# Patient Record
(demographics unavailable — no encounter records)

---

## 2024-10-14 NOTE — ASSESSMENT
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

## 2024-10-14 NOTE — PHYSICAL EXAM
[No Acute Distress] : no acute distress [Well Nourished] : well nourished [Well Developed] : well developed [Well-Appearing] : well-appearing [Normal Sclera/Conjunctiva] : normal sclera/conjunctiva [PERRL] : pupils equal round and reactive to light [EOMI] : extraocular movements intact [Normal Outer Ear/Nose] : the outer ears and nose were normal in appearance [Normal Oropharynx] : the oropharynx was normal [No JVD] : no jugular venous distention [No Lymphadenopathy] : no lymphadenopathy [Supple] : supple [Thyroid Normal, No Nodules] : the thyroid was normal and there were no nodules present [No Respiratory Distress] : no respiratory distress  [No Accessory Muscle Use] : no accessory muscle use [Clear to Auscultation] : lungs were clear to auscultation bilaterally [Normal Rate] : normal rate  [Regular Rhythm] : with a regular rhythm [Normal S1, S2] : normal S1 and S2 [I] : a grade 1 [No Carotid Bruits] : no carotid bruits [No Abdominal Bruit] : a ~M bruit was not heard ~T in the abdomen [No Varicosities] : no varicosities [Pedal Pulses Present] : the pedal pulses are present [No Edema] : there was no peripheral edema [No Palpable Aorta] : no palpable aorta [No Extremity Clubbing/Cyanosis] : no extremity clubbing/cyanosis [Soft] : abdomen soft [Non Tender] : non-tender [Non-distended] : non-distended [No Masses] : no abdominal mass palpated [No HSM] : no HSM [Normal Bowel Sounds] : normal bowel sounds [Normal Posterior Cervical Nodes] : no posterior cervical lymphadenopathy [Normal Anterior Cervical Nodes] : no anterior cervical lymphadenopathy [No CVA Tenderness] : no CVA  tenderness [No Spinal Tenderness] : no spinal tenderness [Kyphosis] : kyphosis [Scoliosis] : scoliosis [No Joint Swelling] : no joint swelling [Grossly Normal Strength/Tone] : grossly normal strength/tone [No Rash] : no rash [Coordination Grossly Intact] : coordination grossly intact [No Focal Deficits] : no focal deficits [Normal Affect] : the affect was normal [Normal Insight/Judgement] : insight and judgment were intact

## 2024-10-14 NOTE — HISTORY OF PRESENT ILLNESS
[FreeTextEntry1] : Here for complete exam and here to follow ongoing conditions [de-identified] : off tamoxifen on anastrozole Last seen in clearance for hysteroscopy with dilation and curettage of endometrial polyp 12/2023 s/p partial parathyroidectomy for primary hyperparathyroidism Dr. Tika Toussaint 6/2018 CAT scan chest May 2018 showing bilateral tree in bud nodules, bronchiectasis, and nodular opacities possibly due to mucoid impaction saw HZ in consultation Patient does have occasional coughing issues last CT chest 1//8/2020 regular gyn and pap Haselcorn RIGHT BREAST DCIS  scoliosis more bothersome saw ortho Alejandro had PT 20 sessions declines CT chest follow up home DBP under 80 osteoporosis seeing Dr Rogers FINISHED eVENITY MGUS MVP Echocardiogram 10/7/2022 showed LVEF 65 to 70%, normal LA, mild aortic stenosis, mild to moderate aortic insufficiency, mild mitral insufficiency Echocardiogram October 12, 2023 showed LVEF 70 to 75%, normal atria, mild mitral insufficiency, fibrocalcific aortic valve sclerosis without stenosis, mild to moderate aortic insufficiency

## 2024-10-14 NOTE — PLAN
[FreeTextEntry1] : check bloods ct chest follow up for bronchiectasis FH mother lung cancer non smoker

## 2024-10-14 NOTE — HEALTH RISK ASSESSMENT
[Very Good] : ~his/her~  mood as very good [No] : No [One fall no injury in past year] : Patient reported one fall in the past year without injury [0] : 2) Feeling down, depressed, or hopeless: Not at all (0) [PHQ-2 Negative - No further assessment needed] : PHQ-2 Negative - No further assessment needed [Never] : Never [NO] : No [Patient reported mammogram was abnormal] : Patient reported mammogram was abnormal [Patient reported PAP Smear was normal] : Patient reported PAP Smear was normal [Patient reported bone density results were abnormal] : Patient reported bone density results were abnormal [Patient reported colonoscopy was abnormal] : Patient reported colonoscopy was abnormal [Fully functional (bathing, dressing, toileting, transferring, walking, feeding)] : Fully functional (bathing, dressing, toileting, transferring, walking, feeding) [Fully functional (using the telephone, shopping, preparing meals, housekeeping, doing laundry, using] : Fully functional and needs no help or supervision to perform IADLs (using the telephone, shopping, preparing meals, housekeeping, doing laundry, using transportation, managing medications and managing finances) [[id number]] : 0 [Change in mental status noted] : No change in mental status noted [Language] : denies difficulty with language [Behavior] : denies difficulty with behavior [Learning/Retaining New Information] : denies difficulty learning/retaining new information [Handling Complex Tasks] : denies difficulty handling complex tasks [Reasoning] : denies difficulty with reasoning [Spatial Ability and Orientation] : denies difficulty with spatial ability and orientation [MammogramDate] : 2/2024 [MammogramComments] : DCIS [PapSmearDate] : 11/2023 [BoneDensityDate] : 10/2023 [ColonoscopyDate] : 9/2024 [ColonoscopyComments] : Inflammatory polyp

## 2024-12-04 NOTE — PROCEDURE
[FreeTextEntry1] : Blood draw CT CHEST  - ORDERED BY: ASAD CRISTOBAL PROCEDURE DATE:  10/28/2024 INTERPRETATION:  CLINICAL INFORMATION: Cough. COMPARISON: CT chest 1/8/2020, 5/26/2018 CONTRAST/COMPLICATIONS: IV Contrast: NONE Oral Contrast: NONE Complications: None reported at time of study completion PROCEDURE: CT scan of the chest was obtained without intravenous contrast. FINDINGS: AIRWAYS/LUNGS/PLEURA: Scattered areas of bronchiectasis predominantly involving the right middle lobe and lingula are unchanged. Chronic segmental atelectasis involving the right middle lobe and lingula is minimally increased from comparison study. There are diffuse bilateral scattered nodular/branching opacities likely representing mucoid opacity distal airways, some of which are new and others of which are increased from comparison exam. Biapical scarring scarring is unchanged. Redemonstrated trace fluid surrounding an area of bronchiectasis in the right upper lobe, not significantly changed from comparison exam. No left pleural effusion. MEDIASTINUM AND NAYAN: No lymphadenopathy. 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 VESSELS: Aortic and coronary artery atherosclerotic changes. HEART: Heart size is normal. No pericardial effusion. VISUALIZED UPPER ABDOMEN: Unchanged right hepatic cyst. Partially imaged left renal cyst. New 8 mm hypodensity in the pancreatic body (2-114). CHEST WALL AND BONES: Chest wall within normal limits. Degenerative changes the bones. IMPRESSION: Bilateral nodular/branching opacities are slightly increased from comparison exam likely representing mucoid impacted distal airways. Bilateral areas of bronchiectasis and atelectasis, not significantly changed from prior exam. 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 Interval development of an 8 mm hypodensity in the pancreatic body which can be further evaluated with contrast-enhanced MRCP  PFT December 4, 2024 Flow rates normal range No bronchodilator sponsor FEV1 Flow volume loop demonstrate some technical limitations due to cough associated with maneuvers Lung volumes normal Total lung present 107% predicted No air trapping Diffusion normal range 79% predicted Hemoglobin 12.3  Pulmonary 6-minute walk exercise study December 4, 2024 Baseline room air O2 saturation 97% Normal study No evidence of exercise-induced hypoxemia

## 2024-12-04 NOTE — REVIEW OF SYSTEMS
[Nasal Congestion] : nasal congestion [Cough] : cough [Negative] : Psychiatric [Hemoptysis] : no hemoptysis [Chest Tightness] : no chest tightness [Sputum] : no sputum [Pleuritic Pain] : no pleuritic pain [Wheezing] : no wheezing [SOB on Exertion] : no sob on exertion [Edema] : no edema [Orthopnea] : no orthopnea [Palpitations] : no palpitations [Phlebitis] : no phlebitis [Syncope] : no syncope [TextBox_44] : AI/ AS, HTN, MVP, Mild HLD [TextBox_113] : DCIS [TextBox_144] : Pre DM, hx hyper parathyroidism with resection

## 2024-12-04 NOTE — PHYSICAL EXAM
[No Acute Distress] : no acute distress [Normal Oropharynx] : normal oropharynx [I] : Mallampati Class: I [Normal Appearance] : normal appearance [No Neck Mass] : no neck mass [Normal Rate/Rhythm] : normal rate/rhythm [Normal S1, S2] : normal s1, s2 [No Murmurs] : no murmurs [No Gallops] : no gallops [No Resp Distress] : no resp distress [No Acc Muscle Use] : no acc muscle use [Normal Palpation] : normal palpation [Normal Rhythm and Effort] : normal rhythm and effort [Clear to Auscultation Bilaterally] : clear to auscultation bilaterally [Normal to Percussion] : normal to percussion [No Abnormalities] : no abnormalities [Benign] : benign [Normal Gait] : normal gait [No Clubbing] : no clubbing [No Cyanosis] : no cyanosis [No Edema] : no edema [FROM] : FROM [Normal Color/ Pigmentation] : normal color/ pigmentation [No Focal Deficits] : no focal deficits [Oriented x3] : oriented x3 [Normal Affect] : normal affect [TextBox_99] : Positive kyphosis

## 2024-12-04 NOTE — HISTORY OF PRESENT ILLNESS
[Never] : never [TextBox_4] : 12/4/2024 73 yo female, referred from Dr. Evans Used to see another pulmonologist who retired, Dr. Henning CT chest was done 11/18/24, findings in chart  cough present, started in the fall, and more than usual  alot of allergies in college, ragweed mentioned, does not really pay attention to allergies as of late  eyes are always ithcy and red year round

## 2024-12-04 NOTE — DISCUSSION/SUMMARY
[FreeTextEntry1] : Cough CT chest imaging as noted Bronchiectasis Mucoid impacted airways Cannot exclude nontuberculosis/ALEXANDRA MAC but patient has a paucity of symptoms and no sputum production At this point I recommended treatment with controller therapy to try to reduce some of the airway inflammatory disease and mucus We then consider a repeat CT chest 3 to 6 months Patient was placed on a sample of Trelegy Ellipta 100 mcg dose 1 puff daily Detailed MDI instructions were provided Patient will rinse after use She was informed to notify office with any complications I did inform her to let us know she does bring up or produce any sputum which should be collected for cultures Other options if there is continued progression of disease will be bronchoscopy for culture

## 2024-12-04 NOTE — CONSULT LETTER
[Dear  ___] : Dear  [unfilled], [Consult Letter:] : I had the pleasure of evaluating your patient, [unfilled]. [Please see my note below.] : Please see my note below. [Consult Closing:] : Thank you very much for allowing me to participate in the care of this patient.  If you have any questions, please do not hesitate to contact me. [Sincerely,] : Sincerely, [FreeTextEntry3] : Boris Chávez D.O., OLENA.  of Medicine [facility]

## 2024-12-31 NOTE — ASSESSMENT
[FreeTextEntry1] : 1. Osteoporosis. H/o PHPT - I'd avoid PTH analogues in view of h/o PHPT - calcium 500 mg qd, extra OTC vitamin D3 2,000 IU/day - add more weight-bearing exercises; advised avoiding high risk sports - dental f/u advised. - s/p Evenity- cont Prolia via BB; injection  today  Once again, risks and benefits were reviewed in details. REMS provided.  *** Prolia 60 mg/ml Lot # [id number] Exp. 04/30/2027  - DXA in 11/25-  patient is now on arimidex - monitor ca/PTH/vit D.  2. H/o prediabetes - monitor a1c. last one is fine.  RTC 6 months for a f/u and Prolia injection

## 2024-12-31 NOTE — HISTORY OF PRESENT ILLNESS
[FreeTextEntry1] : 74 year female  f/u for osteoporosis management.   *** Dec 31, 2024 ***  feels well overall,  had a fall in bar a couple of months ago, but no fractures no jaw/thigh pain staying on Arimidex saw Dr. Ananda Dunbar  (S)- no surgical intervention at present recent labs reviewed as below  DXA(11/18/24)- L1, L4 (+0.9), LFN (-2.8), RFN (-2.6), radius 33% (-3.4) DXA(10/19/23)- L1 and L4 (+0.9), LFN (-2.7), RFN (-2.7), radius 33% (-2.6) DXA (1/21/21) at [facility]- L1 (-3.1). significant OA changes in L2-L4, FN (-2.9)  *** Jun 12, 2024 ***  Patient returned for PE and Prolia injection. doing well. denies jaw/thigh pain started on Arimidex 1 mg qd by Dr. Monahan labs from last week reviewed, as below *** Prolia 60 mg/ml Lot #  Exp.    *** Dec 12, 2023 ***  completed Evenity Patient returned for Prolia injection. Once again, risks and benefits were reviewed in details. REMS provided.  *** Prolia 60 mg/ml Lot # [id number] Exp. 3/31/2026  DXA(10/19/23)- L1 and L4 (+0.9), LFN (-2.7), RFN (-2.7), radius 33% (-2.6) DXA (1/21/21) at [facility]- L1 (-3.1). significant OA changes in L2-L4, FN (-2.9)  *** Sep 11, 2023 ***  Patient returned for Evenity injection # 12 feels well. denies jaw/thighs pain.  Once again, risks and benefits were reviewed in details. REMS provided.  *** Evenity 105 mg/1.17ml Lot # [id number] Exp. 7/25   *** Aug 10, 2023 ***  Patient returned for Evenity injection # 11 feels well. denies jaw/thighs pain.  Once again, risks and benefits were reviewed in details. REMS provided.  *** Evenity 105 mg/1.17ml Lot # [id number] Exp. 9/25   *** Jul 11, 2023 ***  Patient returned for Evenity injection # 10 feels well. denies jaw/thighs pain.  Once again, risks and benefits were reviewed in details. REMS provided.  *** Evenity 105 mg/1.17ml Lot # [id number] Exp. 7/25   *** Jun 08, 2023 ***  Patient returned for Evenity injection # 9 saw a radiation oncologist- decided against it. remains on tamoxifen feels well. denies jaw/thighs pain.  Once again, risks and benefits were reviewed in details. REMS provided.  *** Evenity 105 mg/1.17ml Lot # [id number] Exp. 7/25   *** May 08, 2023 ***  Patient returned for Evenity injection # 8 feels well. denies jaw/thighs pain. s/p lumpectomy (DIS) on 03/09 by Dr. Connell- to see a radiation oncology Once again, risks and benefits were reviewed in details. REMS provided.  *** Evenity 105 mg/1.17ml Lot # [id number] Exp. 6/25  *** Apr 05, 2023 ***  Patient returned for Evenity injection # 7 feels well. denies jaw/thighs pain. s/p lumpectomy (DIS) on 03/09 by Dr. Connell- to see a radiation oncology Once again, risks and benefits were reviewed in details. REMS provided.  *** Evenity 105 mg/1.17ml Lot # [id number] Exp. 6/25  *** Mar 06, 2023 ***  Patient returned for Evenity injection # 6 feels well. denies jaw/thighs pain Once again, risks and benefits were reviewed in details. REMS provided.  *** Evenity 105 mg/1.17ml Lot # [id number] Exp. 2/25  *** Feb 06, 2023 ***  Patient returned for Evenity injection # 5 feels well. denies jaw/thighs pain Once again, risks and benefits were reviewed in details. REMS provided.  *** Evenity 105 mg/1.17ml Lot # [id number] Exp. 5/25    *** Jan 03, 2023 ***  Patient returned for Evenity injection # 4  feels well. denies jaw/thighs pain Once again, risks and benefits were reviewed in details. REMS provided.  *** Evenity 105 mg/1.17ml Lot # [id number] Exp. 2/25  *** Nov 30, 2022 ***  Patient returned for Evenity injection # 3  feels well. denies jaw/thighs pain Once again, risks and benefits were reviewed in details. REMS provided.  *** Evenity 105 mg/1.17ml Lot # [id number] Exp. 1/25   *** Oct 31, 2022 ***  Patient returned for Evenity injection # 2  tolerated well. no jaw/thighs pain Once again, risks and benefits were reviewed in details. REMS provided.  *** Evenity 105 mg/1.17ml Lot # [id number] Exp. 9/24   *** Sep 29, 2022 ***  Patient returned for Evenity injection. Once again, risks and benefits were reviewed in details. REMS provided.  *** Evenity 105 mg/1.17ml Lot # [id number] Exp. 9/24   feels well  labs 2 wks ago- ca- 10.1, creat- 0.66 taking calcium citrate  *** Aug 29, 2022 ***  did not f/u post initial visit seeing Dr. Monahan for a monoclonal gammopathy still undecided about her ART, but is leaning towards Evenity  HPI: She  was diagnosed with osteoporosis in , on Fosamax for 10 years, stopped for about a year after and resumed again for another year and stopped completely in 2011. She has not been on any ART since then.   She reports a history of PHPT, s/p parathyroidectomy in 6/18 (Dr. Toussaint) Cancer history: none Calcium and vitamin D supplementation: vitamin D3 1000 IU/day Fracture history: wrist fracture post fall in 2015 Bone disease risk factors: no history of kidney stones, liver disease, kidney disease, thyroid disease, anticonvulsant use, glucocorticoid use, autoimmune disorders such as rheumatoid arthritis and SLE, COPD, IBD, known malabsorption disorders, or weight loss.  Currently, no symptoms of polyuria, polydipsia, constipation, abdominal pain, mental confusion, depression, bone pain or fractures.  She  denies hot flashes, leg swelling, blood clots, history of radiation therapy. She  lost about 3" in height. She has a MVP, which has been stable. She goes to gym regularly.  Lab:  calcium- 10.2, TP- 8.6 DXA (1/21/21) at [facility]- L1 (-3.1). significant OA changes in L2-L4, FN (-2.9) DXA (4/24/18) at [facility] - FN (-3.1), LS (-0.7)- but no detailed images are available for review

## 2025-01-08 NOTE — HISTORY OF PRESENT ILLNESS
[Never] : never [TextBox_4] : 1/8/2025 75 yo female, here for follow up  is on trelegy 100 - she has had improvement with breathing / cough - 1 month sample supplied  cough still there but less - cough more so when laying down  was getting allergy shots at one point, but discontinued  is taking zaditor for itchy eyes BID   12/4/2024 75 yo female, referred from Dr. Evans Used to see another pulmonologist who retired, Dr. Henning CT chest was done 11/18/24, findings in chart  cough present, started in the fall, and more than usual  alot of allergies in college, ragweed mentioned, does not really pay attention to allergies as of late  eyes are always ithcy and red year round

## 2025-01-08 NOTE — CONSULT LETTER
[Dear  ___] : Dear  [unfilled], [Consult Letter:] : I had the pleasure of evaluating your patient, [unfilled]. [Please see my note below.] : Please see my note below. [Consult Closing:] : Thank you very much for allowing me to participate in the care of this patient.  If you have any questions, please do not hesitate to contact me. [Sincerely,] : Sincerely, [FreeTextEntry3] : Boris Chávez D.O., OLENA.  of Medicine [facility]

## 2025-01-08 NOTE — PROCEDURE
[FreeTextEntry1] : LEIGH ANN 1/8/25 flow  rates WNL stable  CT CHEST  - ORDERED BY: ASAD CRISTOBAL PROCEDURE DATE:  10/28/2024 INTERPRETATION:  CLINICAL INFORMATION: Cough. COMPARISON: CT chest 1/8/2020, 5/26/2018 CONTRAST/COMPLICATIONS: IV Contrast: NONE Oral Contrast: NONE Complications: None reported at time of study completion PROCEDURE: CT scan of the chest was obtained without intravenous contrast. FINDINGS: AIRWAYS/LUNGS/PLEURA: Scattered areas of bronchiectasis predominantly involving the right middle lobe and lingula are unchanged. Chronic segmental atelectasis involving the right middle lobe and lingula is minimally increased from comparison study. There are diffuse bilateral scattered nodular/branching opacities likely representing mucoid opacity distal airways, some of which are new and others of which are increased from comparison exam. Biapical scarring scarring is unchanged. Redemonstrated trace fluid surrounding an area of bronchiectasis in the right upper lobe, not significantly changed from comparison exam. No left pleural effusion. MEDIASTINUM AND NAYAN: No lymphadenopathy. 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 VESSELS: Aortic and coronary artery atherosclerotic changes. HEART: Heart size is normal. No pericardial effusion. VISUALIZED UPPER ABDOMEN: Unchanged right hepatic cyst. Partially imaged left renal cyst. New 8 mm hypodensity in the pancreatic body (2-114). CHEST WALL AND BONES: Chest wall within normal limits. Degenerative changes the bones. IMPRESSION: Bilateral nodular/branching opacities are slightly increased from comparison exam likely representing mucoid impacted distal airways. Bilateral areas of bronchiectasis and atelectasis, not significantly changed from prior exam. 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 Interval development of an 8 mm hypodensity in the pancreatic body which can be further evaluated with contrast-enhanced MRCP  PFT December 4, 2024 Flow rates normal range No bronchodilator sponsor FEV1 Flow volume loop demonstrate some technical limitations due to cough associated with maneuvers Lung volumes normal Total lung present 107% predicted No air trapping Diffusion normal range 79% predicted Hemoglobin 12.3  Pulmonary 6-minute walk exercise study December 4, 2024 Baseline room air O2 saturation 97% Normal study No evidence of exercise-induced hypoxemia  MDI detailed instructions 1/8/25

## 2025-01-08 NOTE — DISCUSSION/SUMMARY
[FreeTextEntry1] : Cough better on Trelegy 100 mcg 1 puff QD CT chest imaging as noted Bronchiectasis Mucoid impacted airways No Sputum Cannot exclude nontuberculosis/ALEXANDRA MAC but patient has a paucity of symptoms and no sputum production At this point I recommended treatment with controller therapy to try to reduce some of the airway inflammatory disease and mucus We then consider a repeat CT chest 3 to 6 months Patient was placed on a sample of Trelegy Ellipta 100 mcg dose 1 puff daily Detailed MDI instructions were provided Patient will rinse after use She was informed to notify office with any complications I did inform her to let us know she does bring up or produce any sputum which should be collected for cultures Other options if there is continued progression of disease will be bronchoscopy for culture Notify of any wheezing.  Notify if rescue inhaler is needed greater than 2-3 times in any week.  Avoid known triggers.

## 2025-04-09 NOTE — PROCEDURE
[FreeTextEntry1] : PFT 4/9/25  Spieo WNL  Lung Volumes WNL  DLCO 68 % with mild loss fx  alveolar  capillary units  HGB 12.0  LEIGH ANN 1/8/25 flow  rates WNL stable  CT CHEST  - ORDERED BY: ASAD CRISTOBAL PROCEDURE DATE:  10/28/2024 INTERPRETATION:  CLINICAL INFORMATION: Cough. COMPARISON: CT chest 1/8/2020, 5/26/2018 CONTRAST/COMPLICATIONS: IV Contrast: NONE Oral Contrast: NONE Complications: None reported at time of study completion PROCEDURE: CT scan of the chest was obtained without intravenous contrast. FINDINGS: AIRWAYS/LUNGS/PLEURA: Scattered areas of bronchiectasis predominantly involving the right middle lobe and lingula are unchanged. Chronic segmental atelectasis involving the right middle lobe and lingula is minimally increased from comparison study. There are diffuse bilateral scattered nodular/branching opacities likely representing mucoid opacity distal airways, some of which are new and others of which are increased from comparison exam. Biapical scarring scarring is unchanged. Redemonstrated trace fluid surrounding an area of bronchiectasis in the right upper lobe, not significantly changed from comparison exam. No left pleural effusion. MEDIASTINUM AND NAYAN: No lymphadenopathy. 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 VESSELS: Aortic and coronary artery atherosclerotic changes. HEART: Heart size is normal. No pericardial effusion. VISUALIZED UPPER ABDOMEN: Unchanged right hepatic cyst. Partially imaged left renal cyst. New 8 mm hypodensity in the pancreatic body (2-114). CHEST WALL AND BONES: Chest wall within normal limits. Degenerative changes the bones. IMPRESSION: Bilateral nodular/branching opacities are slightly increased from comparison exam likely representing mucoid impacted distal airways. Bilateral areas of bronchiectasis and atelectasis, not significantly changed from prior exam. 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 Interval development of an 8 mm hypodensity in the pancreatic body which can be further evaluated with contrast-enhanced MRCP  PFT December 4, 2024 Flow rates normal range No bronchodilator sponsor FEV1 Flow volume loop demonstrate some technical limitations due to cough associated with maneuvers Lung volumes normal Total lung present 107% predicted No air trapping Diffusion normal range 79% predicted Hemoglobin 12.3  Pulmonary 6-minute walk exercise study December 4, 2024 Baseline room air O2 saturation 97% Normal study No evidence of exercise-induced hypoxemia  MDI detailed instructions 1/8/25

## 2025-04-09 NOTE — DISCUSSION/SUMMARY
[FreeTextEntry1] : Cough better on Trelegy 100 mcg 1 puff QD CT chest imaging as noted Request to hold off recommended f/u Bronchiectasis Mucoid impacted airways No Sputum Cannot exclude nontuberculosis/ALEXANDRA MAC but patient has a paucity of symptoms and no sputum production At this point I recommended treatment with controller therapy to try to reduce some of the airway inflammatory disease and mucus We then consider a repeat CT chest 3 to 6 months address at f/u as per  patient request hold Patient was placed on a sample of Trelegy Ellipta 100 mcg dose 1 puff daily Detailed MDI instructions were provided Patient will rinse after use She was informed to notify office with any complications I did inform her to let us know she does bring up or produce any sputum which should be collected for cultures Other options if there is continued progression of disease will be bronchoscopy for culture Notify of any wheezing.  Notify if rescue inhaler is needed greater than 2-3 times in any week.  Avoid known triggers.

## 2025-04-09 NOTE — CONSULT LETTER
[Dear  ___] : Dear  [unfilled], [Consult Letter:] : I had the pleasure of evaluating your patient, [unfilled]. [Please see my note below.] : Please see my note below. [Consult Closing:] : Thank you very much for allowing me to participate in the care of this patient.  If you have any questions, please do not hesitate to contact me. [Sincerely,] : Sincerely, [FreeTextEntry3] : Boris Chávez D.O., OLENA.  of Medicine [facility]

## 2025-07-08 NOTE — ASSESSMENT
[FreeTextEntry1] : 1. Osteoporosis. H/o PHPT - I'd avoid PTH analogues in view of h/o PHPT - calcium 500 mg qd, increase OTC vitamin D3 2,000 IU/day - add more weight-bearing exercises; advised avoiding high risk sports - dental f/u advised. - s/p Evenity- cont Prolia via BB; injection  today  Once again, risks and benefits were reviewed in details. REMS provided.  *** Prolia 60 mg/ml Lot # [id number] Exp. 12/31/2027  - DXA in 11/25-  patient is now on arimidex - monitor ca/PTH/vit D.  2. H/o prediabetes - monitor a1c. last one is fine.  RTC 6 months for a f/u and Prolia injection

## 2025-07-08 NOTE — HISTORY OF PRESENT ILLNESS
[FreeTextEntry1] : 75 year female  f/u for osteoporosis management.   *** Jul 08, 2025 ***  feels that scoliosis is getting worse, looking for a PT place stopped Arimidex b/o concerns of her DXA labs reviewed as below ca- 10.6, PTH- 45, 25D- 28, a1c- 5.3 taking ca 500 mg qd, vit D3 1000 IU/day  *** Dec 31, 2024 ***  feels well overall,  [place] a [place] in [place] a couple of months ago, but no fractures no jaw/thigh pain staying on Arimidex saw Dr. Ananda Dunbar  ([facility])- no surgical intervention at present recent labs reviewed as below  DXA(11/18/24)- L1, L4 (+0.9), LFN (-2.8), RFN (-2.6), radius 33% (-3.4) DXA(10/19/23)- L1 and L4 (+0.9), LFN (-2.7), RFN (-2.7), radius 33% (-2.6) DXA (1/21/21) at [facility]- L1 (-3.1). significant OA changes in L2-L4, FN (-2.9)  *** Jun 12, 2024 ***  Patient returned for PE and Prolia injection. doing well. denies jaw/thigh pain started on Arimidex 1 mg qd by Dr. Monahan labs from last week reviewed, as below *** Prolia 60 mg/ml Lot #  Exp.    *** Dec 12, 2023 ***  completed Evenity Patient returned for Prolia injection. Once again, risks and benefits were reviewed in details. REMS provided.  *** Prolia 60 mg/ml Lot # [id number] Exp. 3/31/2026  DXA(10/19/23)- L1 and L4 (+0.9), LFN (-2.7), RFN (-2.7), radius 33% (-2.6) DXA (1/21/21) at [facility]- L1 (-3.1). significant OA changes in L2-L4, FN (-2.9)  *** Sep 11, 2023 ***  Patient returned for Evenity injection # 12 feels well. denies jaw/thighs pain.  Once again, risks and benefits were reviewed in details. REMS provided.  *** Evenity 105 mg/1.17ml Lot # [id number] Exp. 7/25   *** Aug 10, 2023 ***  Patient returned for Evenity injection # 11 feels well. denies jaw/thighs pain.  Once again, risks and benefits were reviewed in details. REMS provided.  *** Evenity 105 mg/1.17ml Lot # [id number] Exp. 9/25   *** Jul 11, 2023 ***  Patient returned for Evenity injection # 10 feels well. denies jaw/thighs pain.  Once again, risks and benefits were reviewed in details. REMS provided.  *** Evenity 105 mg/1.17ml Lot # [id number] Exp. 7/25   *** Jun 08, 2023 ***  Patient returned for Evenity injection # 9 saw a radiation oncologist- decided against it. remains on tamoxifen feels well. denies jaw/thighs pain.  Once again, risks and benefits were reviewed in details. REMS provided.  *** Evenity 105 mg/1.17ml Lot # [id number] Exp. 7/25   *** May 08, 2023 ***  Patient returned for Evenity injection # 8 feels well. denies jaw/thighs pain. s/p lumpectomy (DIS) on 03/09 by Dr. Connell- to see a radiation oncology Once again, risks and benefits were reviewed in details. REMS provided.  *** Evenity 105 mg/1.17ml Lot # [id number] Exp. 6/25  *** Apr 05, 2023 ***  Patient returned for Evenity injection # 7 feels well. denies jaw/thighs pain. s/p lumpectomy (DIS) on 03/09 by Dr. Connell- to see a radiation oncology Once again, risks and benefits were reviewed in details. REMS provided.  *** Evenity 105 mg/1.17ml Lot # [id number] Exp. 6/25  *** Mar 06, 2023 ***  Patient returned for Evenity injection # 6 feels well. denies jaw/thighs pain Once again, risks and benefits were reviewed in details. REMS provided.  *** Evenity 105 mg/1.17ml Lot # [id number] Exp. 2/25  *** Feb 06, 2023 ***  Patient returned for Evenity injection # 5 feels well. denies jaw/thighs pain Once again, risks and benefits were reviewed in details. REMS provided.  *** Evenity 105 mg/1.17ml Lot # [id number] Exp. 5/25    *** Jan 03, 2023 ***  Patient returned for Evenity injection # 4  feels well. denies jaw/thighs pain Once again, risks and benefits were reviewed in details. REMS provided.  *** Evenity 105 mg/1.17ml Lot # [id number] Exp. 2/25  *** Nov 30, 2022 ***  Patient returned for Evenity injection # 3  feels well. denies jaw/thighs pain Once again, risks and benefits were reviewed in details. REMS provided.  *** Evenity 105 mg/1.17ml Lot # [id number] Exp. 1/25   *** Oct 31, 2022 ***  Patient returned for Evenity injection # 2  tolerated well. no jaw/thighs pain Once again, risks and benefits were reviewed in details. REMS provided.  *** Evenity 105 mg/1.17ml Lot # [id number] Exp. 9/24   *** Sep 29, 2022 ***  Patient returned for Evenity injection. Once again, risks and benefits were reviewed in details. REMS provided.  *** Evenity 105 mg/1.17ml Lot # [id number] Exp. 9/24   feels well  labs 2 wks ago- ca- 10.1, creat- 0.66 taking calcium citrate  *** Aug 29, 2022 ***  did not f/u post initial visit seeing Dr. Monahan for a monoclonal gammopathy still undecided about her ART, but is leaning towards Evenity  HPI: She  was diagnosed with osteoporosis in , on Fosamax for 10 years, stopped for about a year after and resumed again for another year and stopped completely in 2011. She has not been on any ART since then.   She reports a history of PHPT, s/p parathyroidectomy in 6/18 (Dr. Toussaint) Cancer history: none Calcium and vitamin D supplementation: vitamin D3 1000 IU/day Fracture history: wrist fracture post fall in 2015 Bone disease risk factors: no history of kidney stones, liver disease, kidney disease, thyroid disease, anticonvulsant use, glucocorticoid use, autoimmune disorders such as rheumatoid arthritis and SLE, COPD, IBD, known malabsorption disorders, or weight loss.  Currently, no symptoms of polyuria, polydipsia, constipation, abdominal pain, mental confusion, depression, bone pain or fractures.  She  denies hot flashes, leg swelling, blood clots, history of radiation therapy. She  lost about 3" in height. She has a MVP, which has been stable. She goes to gym regularly.  Lab:  calcium- 10.2, TP- 8.6 DXA (1/21/21) at [facility]- L1 (-3.1). significant OA changes in L2-L4, FN (-2.9) DXA (4/24/18) at [facility] - FN (-3.1), LS (-0.7)- but no detailed images are available for review

## 2025-07-16 NOTE — HISTORY OF PRESENT ILLNESS
[Never] : never [TextBox_4] : 1/8/2025 73 yo female, here for follow up  is on trelegy 100 - she has had improvement with breathing / cough - 1 month sample supplied  cough still there but less - cough more so when laying down  was getting allergy shots at one point, but discontinued  is taking zaditor for itchy eyes BID   12/4/2024 73 yo female, referred from Dr. Evans Used to see another pulmonologist who retired, Dr. Henning CT chest was done 11/18/24, findings in chart  cough present, started in the fall, and more than usual  alot of allergies in college, ragweed mentioned, does not really pay attention to allergies as of late  eyes are always ithcy and red year round

## 2025-07-16 NOTE — CONSULT LETTER
[Dear  ___] : Dear  [unfilled], [Consult Letter:] : I had the pleasure of evaluating your patient, [unfilled]. [Please see my note below.] : Please see my note below. [Consult Closing:] : Thank you very much for allowing me to participate in the care of this patient.  If you have any questions, please do not hesitate to contact me. [Sincerely,] : Sincerely, [FreeTextEntry3] : Boris Chávez D.O., OLENA.  of Medicine [facility]

## 2025-07-16 NOTE — PROCEDURE
[FreeTextEntry1] : LEIGH ANN No BD  on TRELEGY  7/16/25  Normal study  interval improvement at flow rates NIOX  10  ppb WNL  7/16/25  PFT 4/9/25 LEIGH ANN WNL  Lung Volumes WNL  DLCO 68 % with mild loss fx  alveolar  capillary units  HGB 12.0  LEIGH ANN 1/8/25 flow  rates WNL stable  CT CHEST  - ORDERED BY: ASAD CRISTOBAL PROCEDURE DATE:  10/28/2024 INTERPRETATION:  CLINICAL INFORMATION: Cough. COMPARISON: CT chest 1/8/2020, 5/26/2018 CONTRAST/COMPLICATIONS: IV Contrast: NONE Oral Contrast: NONE Complications: None reported at time of study completion PROCEDURE: CT scan of the chest was obtained without intravenous contrast. FINDINGS: AIRWAYS/LUNGS/PLEURA: Scattered areas of bronchiectasis predominantly involving the right middle lobe and lingula are unchanged. Chronic segmental atelectasis involving the right middle lobe and lingula is minimally increased from comparison study. There are diffuse bilateral scattered nodular/branching opacities likely representing mucoid opacity distal airways, some of which are new and others of which are increased from comparison exam. Biapical scarring scarring is unchanged. Redemonstrated trace fluid surrounding an area of bronchiectasis in the right upper lobe, not significantly changed from comparison exam. No left pleural effusion. MEDIASTINUM AND NAYAN: No lymphadenopathy. 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 VESSELS: Aortic and coronary artery atherosclerotic changes. HEART: Heart size is normal. No pericardial effusion. VISUALIZED UPPER ABDOMEN: Unchanged right hepatic cyst. Partially imaged left renal cyst. New 8 mm hypodensity in the pancreatic body (2-114). CHEST WALL AND BONES: Chest wall within normal limits. Degenerative changes the bones. IMPRESSION: Bilateral nodular/branching opacities are slightly increased from comparison exam likely representing mucoid impacted distal airways. Bilateral areas of bronchiectasis and atelectasis, not significantly changed from prior exam. 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 Interval development of an 8 mm hypodensity in the pancreatic body which can be further evaluated with contrast-enhanced MRCP  PFT December 4, 2024 Flow rates normal range No bronchodilator sponsor FEV1 Flow volume loop demonstrate some technical limitations due to cough associated with maneuvers Lung volumes normal Total lung present 107% predicted No air trapping Diffusion normal range 79% predicted Hemoglobin 12.3  Pulmonary 6-minute walk exercise study December 4, 2024 Baseline room air O2 saturation 97% Normal study No evidence of exercise-induced hypoxemia  MDI detailed instructions 1/8/25